# Patient Record
Sex: FEMALE | Race: WHITE | NOT HISPANIC OR LATINO | Employment: FULL TIME | URBAN - METROPOLITAN AREA
[De-identification: names, ages, dates, MRNs, and addresses within clinical notes are randomized per-mention and may not be internally consistent; named-entity substitution may affect disease eponyms.]

---

## 2017-01-16 ENCOUNTER — HOSPITAL ENCOUNTER (OUTPATIENT)
Dept: LAB | Facility: MEDICAL CENTER | Age: 37
End: 2017-01-16
Attending: NURSE PRACTITIONER
Payer: COMMERCIAL

## 2017-01-16 ENCOUNTER — HOSPITAL ENCOUNTER (OUTPATIENT)
Facility: MEDICAL CENTER | Age: 37
End: 2017-01-16
Attending: INTERNAL MEDICINE
Payer: COMMERCIAL

## 2017-01-16 PROCEDURE — 83516 IMMUNOASSAY NONANTIBODY: CPT

## 2017-01-16 PROCEDURE — 36415 COLL VENOUS BLD VENIPUNCTURE: CPT

## 2017-01-16 PROCEDURE — 82784 ASSAY IGA/IGD/IGG/IGM EACH: CPT

## 2017-01-17 LAB — IGA SERPL-MCNC: 130 MG/DL (ref 68–408)

## 2017-01-18 LAB — TTG IGA SER IA-ACNC: 0 U/ML (ref 0–3)

## 2017-02-28 ENCOUNTER — OFFICE VISIT (OUTPATIENT)
Dept: URGENT CARE | Facility: PHYSICIAN GROUP | Age: 37
End: 2017-02-28
Payer: COMMERCIAL

## 2017-02-28 VITALS
TEMPERATURE: 98.8 F | SYSTOLIC BLOOD PRESSURE: 148 MMHG | DIASTOLIC BLOOD PRESSURE: 80 MMHG | HEART RATE: 70 BPM | OXYGEN SATURATION: 97 % | BODY MASS INDEX: 25.61 KG/M2 | WEIGHT: 150 LBS | HEIGHT: 64 IN

## 2017-02-28 DIAGNOSIS — J01.00 ACUTE NON-RECURRENT MAXILLARY SINUSITIS: ICD-10-CM

## 2017-02-28 PROCEDURE — 99214 OFFICE O/P EST MOD 30 MIN: CPT | Performed by: PHYSICIAN ASSISTANT

## 2017-02-28 RX ORDER — AMOXICILLIN AND CLAVULANATE POTASSIUM 875; 125 MG/1; MG/1
1 TABLET, FILM COATED ORAL 2 TIMES DAILY
Qty: 14 TAB | Refills: 0 | Status: SHIPPED | OUTPATIENT
Start: 2017-02-28 | End: 2017-03-07

## 2017-02-28 ASSESSMENT — ENCOUNTER SYMPTOMS
CHILLS: 0
SHORTNESS OF BREATH: 0
SORE THROAT: 1
WHEEZING: 0
PALPITATIONS: 0
COUGH: 1
HEADACHES: 1
FEVER: 0
SINUS PRESSURE: 1

## 2017-02-28 NOTE — Clinical Note
February 28, 2017         Patient: Kate Pierce   YOB: 1980   Date of Visit: 2/28/2017           To Whom it May Concern:    Kate Pierce was seen in my clinic on 2/28/2017. Please excuse her from work on 3/1/2017  If you have any questions or concerns, please don't hesitate to call.        Sincerely,           Ney Alvarez PA-C  Electronically Signed

## 2017-03-01 NOTE — PROGRESS NOTES
Subjective:      Kate Pierce is a 36 y.o. female who presents with Pharyngitis            Sinus Problem  This is a new problem. The current episode started in the past 7 days. The problem is unchanged. The pain is moderate. Associated symptoms include congestion, coughing, ear pain, headaches, sinus pressure and a sore throat. Pertinent negatives include no chills or shortness of breath. Past treatments include oral decongestants. The treatment provided no relief.       Review of Systems   Constitutional: Negative for fever and chills.   HENT: Positive for congestion, ear pain, sinus pressure and sore throat.    Respiratory: Positive for cough. Negative for shortness of breath and wheezing.    Cardiovascular: Negative for chest pain and palpitations.   Neurological: Positive for headaches.     All other systems reviewed and are negative.  PMH:  has a past medical history of Shingles (2011).  MEDS:   Current outpatient prescriptions:   •  amoxicillin-clavulanate (AUGMENTIN) 875-125 MG Tab, Take 1 Tab by mouth 2 times a day for 7 days., Disp: 14 Tab, Rfl: 0  •  norgestimate-ethinyl estradiol (ORTHO-CYCLEN) 0.25-35 MG-MCG per tablet, Take 1 Tab by mouth every day., Disp: 28 Tab, Rfl: 11  •  Calcium-Magnesium-Vitamin D (CALCIUM 500 PO), Take  by mouth., Disp: , Rfl:   •  tretinoin (RETIN-A) 0.1 % cream, Apply to acne sites every evening, Disp: 45 g, Rfl: 3  •  benzoyl peroxide-erythromycin (BENZAMYCIN) gel, Apply to acne sites every morning, Disp: 50 g, Rfl: 3  ALLERGIES: No Known Allergies  SURGHX: No past surgical history on file.  SOCHX:  reports that she quit smoking about 2 years ago. Her smoking use included Cigarettes. She has a 7.5 pack-year smoking history. She has never used smokeless tobacco. She reports that she drinks alcohol. She reports that she does not use illicit drugs.  FH: Family history was reviewed, no pertinent findings to report  Medications, Allergies, and current problem list reviewed today  "in Epic       Objective:     /80 mmHg  Pulse 70  Temp(Src) 37.1 °C (98.8 °F)  Ht 1.626 m (5' 4\")  Wt 68.04 kg (150 lb)  BMI 25.73 kg/m2  SpO2 97%     Physical Exam   Constitutional: She is oriented to person, place, and time. Vital signs are normal. She appears well-developed and well-nourished.   HENT:   Head: Normocephalic and atraumatic.   Right Ear: Hearing, tympanic membrane, external ear and ear canal normal.   Left Ear: Hearing, tympanic membrane, external ear and ear canal normal.   Nose: Mucosal edema and rhinorrhea present. No sinus tenderness. No epistaxis. Right sinus exhibits maxillary sinus tenderness. Left sinus exhibits maxillary sinus tenderness.   Mouth/Throat: Uvula is midline, oropharynx is clear and moist and mucous membranes are normal.   Neck: Normal range of motion. Neck supple.   Cardiovascular: Normal rate, regular rhythm, normal heart sounds and intact distal pulses.    Pulmonary/Chest: Effort normal and breath sounds normal.   Neurological: She is alert and oriented to person, place, and time.   Skin: Skin is warm and dry.   Psychiatric: She has a normal mood and affect. Her behavior is normal.   Vitals reviewed.              Assessment/Plan:     1. Acute non-recurrent maxillary sinusitis  -flonase/afrin  - amoxicillin-clavulanate (AUGMENTIN) 875-125 MG Tab; Take 1 Tab by mouth 2 times a day for 7 days.  Dispense: 14 Tab; Refill: 0    Differential diagnosis, natural history, supportive care, and indications for immediate follow-up discussed at length.   Follow-up with primary care provider within 4-5 days, emergency room precautions discussed.  Patient and/or family appears understanding of information.        "

## 2017-03-01 NOTE — PATIENT INSTRUCTIONS

## 2017-04-08 ENCOUNTER — OFFICE VISIT (OUTPATIENT)
Dept: URGENT CARE | Facility: PHYSICIAN GROUP | Age: 37
End: 2017-04-08
Payer: COMMERCIAL

## 2017-04-08 VITALS
TEMPERATURE: 97.2 F | SYSTOLIC BLOOD PRESSURE: 112 MMHG | BODY MASS INDEX: 25.61 KG/M2 | DIASTOLIC BLOOD PRESSURE: 72 MMHG | OXYGEN SATURATION: 97 % | RESPIRATION RATE: 16 BRPM | WEIGHT: 150 LBS | HEIGHT: 64 IN | HEART RATE: 81 BPM

## 2017-04-08 DIAGNOSIS — J01.00 ACUTE NON-RECURRENT MAXILLARY SINUSITIS: ICD-10-CM

## 2017-04-08 DIAGNOSIS — H10.31 ACUTE BACTERIAL CONJUNCTIVITIS OF RIGHT EYE: ICD-10-CM

## 2017-04-08 PROCEDURE — 99214 OFFICE O/P EST MOD 30 MIN: CPT | Performed by: PHYSICIAN ASSISTANT

## 2017-04-08 RX ORDER — AMOXICILLIN AND CLAVULANATE POTASSIUM 875; 125 MG/1; MG/1
1 TABLET, FILM COATED ORAL 2 TIMES DAILY
Qty: 20 TAB | Refills: 0 | Status: SHIPPED | OUTPATIENT
Start: 2017-04-08 | End: 2017-04-18

## 2017-04-08 RX ORDER — OFLOXACIN 3 MG/ML
SOLUTION/ DROPS OPHTHALMIC
Qty: 5 ML | Refills: 1 | Status: SHIPPED | OUTPATIENT
Start: 2017-04-08 | End: 2017-08-25

## 2017-04-08 ASSESSMENT — ENCOUNTER SYMPTOMS
EYE REDNESS: 1
EYE PAIN: 1
COUGH: 1
EYE DISCHARGE: 1
GASTROINTESTINAL NEGATIVE: 1
DIZZINESS: 0
HEADACHES: 1
CARDIOVASCULAR NEGATIVE: 1
MUSCULOSKELETAL NEGATIVE: 1

## 2017-04-08 NOTE — MR AVS SNAPSHOT
"        Kate Pierce   2017 10:25 AM   Office Visit   MRN: [id number]    Department:  [facility]   Dept Phone:  [phone]    Description:  Female : 1980   Provider:  Dex Rankin PA-C           Reason for Visit     Eye Problem redness, discharge, right eye, x 1 day      Allergies as of 2017     No Known Allergies      You were diagnosed with     Acute bacterial conjunctivitis of right eye   [0135885]       Acute non-recurrent maxillary sinusitis   [[id number]]         Vital Signs     Blood Pressure Pulse Temperature Respirations Height Weight    112/72 mmHg 81 36.2 °C (97.2 °F) 16 1.626 m (5' 4.02\") 68.04 kg (150 lb)    Body Mass Index Oxygen Saturation Smoking Status             25.73 kg/m2 97% Former Smoker         Basic Information     Date Of Birth Sex Race Ethnicity Preferred Language    1980 Female White Non- English      Problem List              ICD-10-CM Priority Class Noted - Resolved    Generalized abdominal pain R10.84   3/23/2016 - Present    Acne vulgaris L70.0   3/23/2016 - Present    Ganglion of right wrist M67.431   3/23/2016 - Present      Health Maintenance        Date Due Completion Dates    IMM DTaP/Tdap/Td Vaccine (1 - Tdap) 2000 ---    PAP SMEAR 2002 ---            Current Immunizations     Hepatitis B Vaccine Recombivax (Adol/Adult) 2016    Influenza Vaccine Quad Inj (Preserved) 2016    MMR Vaccine 2016    QF GOLD 2016    QUANTIFERON GOLD 2016      Below and/or attached are the medications your provider expects you to take. Review all of your home medications and newly ordered medications with your provider and/or pharmacist. Follow medication instructions as directed by your provider and/or pharmacist. Please keep your medication list with you and share with your provider. Update the information when medications are discontinued, doses are changed, or new medications (including over-the-counter products) are added; " and carry medication information at all times in the event of emergency situations     Allergies:  No Known Allergies          Medications  Valid as of: April 08, 2017 - 11:43 AM    Generic Name Brand Name Tablet Size Instructions for use    Amoxicillin-Pot Clavulanate (Tab) AUGMENTIN 875-125 MG Take 1 Tab by mouth 2 times a day for 10 days.        Benzoyl Peroxide-Erythromycin (Gel) BENZAMYCIN 5-3 % Apply to acne sites every morning        Calcium-Magnesium-Vitamin D   Take  by mouth.        Norgestimate-Eth Estradiol (Tab) ORTHO-CYCLEN 0.25-35 MG-MCG Take 1 Tab by mouth every day.        Ofloxacin (Solution) OCUFLOX 0.3 % 1-2 DROPS TO AFFECTED EYE EVERY 2-4 HOURS WHILE AWAKE. USE FOR ONE EXTRA DAY AFTER BETTER.        Tretinoin (Cream) RETIN-A 0.1 % Apply to acne sites every evening        .                 Medicines prescribed today were sent to:     [id number] - [place], [place] [id number] [address] [address]    Phone: [phone] Fax: [phone]    Open 24 Hours?: No      Medication refill instructions:       If your prescription bottle indicates you have medication refills left, it is not necessary to call your provider’s office. Please contact your pharmacy and they will refill your medication.    If your prescription bottle indicates you do not have any refills left, you may request refills at any time through one of the following ways: The online [employer] system (except Urgent Care), by calling your provider’s office, or by asking your pharmacy to contact your provider’s office with a refill request. Medication refills are processed only during regular business hours and may not be available until the next business day. Your provider may request additional information or to have a follow-up visit with you prior to refilling your medication.   *Please Note: Medication refills are assigned a new Rx number when refilled electronically. Your pharmacy may indicate that no  refills were authorized even though a new prescription for the same medication is available at the pharmacy. Please request the medicine by name with the pharmacy before contacting your provider for a refill.        Instructions    Nasal saline irrigation (netti pot or Darion Med Sinus Rinse).  Used distilled water or boiled tap water with nasal flushes, not straight tap water.  Humidifier at bedtime.  Hot steam showers to loosen up mucous.  Cough medicine at bedtime.  Lots of fluids, tea with honey.  Ibuprofen for headache, fever, chills.  Be sure to take with food.  Salt water gargles.  Return if worsening: Yellow thicker mucus changes, worsening pain around the eyes and radiates to the teeth, and fever over 101°F.            [employer] Access Code: Activation code not generated  Current [employer] Status: Active

## 2017-04-08 NOTE — PROGRESS NOTES
Subjective:      Kate Pierce is a 36 y.o. female who presents with Eye Problem            Eye Problem   Associated symptoms include an eye discharge and eye redness.     Chief Complaint   Patient presents with   • Eye Problem     redness, discharge, right eye, x 1 day       HPI:  Kate Pierce is a 36 y.o. female who presents with red eye pain and discharge.  Was wearing contact lens yesterday.  Woke with goup/matted eye.  Contact at work with conjunctivitis.  Also having sinus congestion and pain around the eyes. Yellow to white thicker mucus production. She did not complete Augmentin prescribed back in February. No nausea. Worsening fatigue but no fever or chills.     Past Medical History   Diagnosis Date   • Shingles 2011       No past surgical history on file.    Family History   Problem Relation Age of Onset   • Hypertension Mother    • Diabetes Father        Social History     Social History   • Marital Status:      Spouse Name: N/A   • Number of Children: N/A   • Years of Education: N/A     Occupational History   • Not on file.     Social History Main Topics   • Smoking status: Former Smoker -- 0.50 packs/day for 15 years     Types: Cigarettes     Quit date: 12/23/2014   • Smokeless tobacco: Never Used   • Alcohol Use: Yes   • Drug Use: No   • Sexual Activity:     Partners: Male     Birth Control/ Protection: Pill     Other Topics Concern   • Not on file     Social History Narrative    She is  and just recently moved to [place] from [facility]. She works at [employer] in [employer]          Current outpatient prescriptions:   •  norgestimate-ethinyl estradiol, 1 Tab, Oral, DAILY  •  Calcium-Magnesium-Vitamin D (CALCIUM 500 PO), Take  by mouth.  •  tretinoin, Apply to acne sites every evening  •  benzoyl peroxide-erythromycin, Apply to acne sites every morning    No Known Allergies         Review of Systems   Constitutional: Positive for malaise/fatigue.   HENT: Positive for congestion and ear  "pain.    Eyes: Positive for pain, discharge and redness.   Respiratory: Positive for cough.    Cardiovascular: Negative.    Gastrointestinal: Negative.    Musculoskeletal: Negative.    Skin: Negative.    Neurological: Positive for headaches. Negative for dizziness.   All other systems reviewed and are negative.         Objective:     /72 mmHg  Pulse 81  Temp(Src) 36.2 °C (97.2 °F)  Resp 16  Ht 1.626 m (5' 4.02\")  Wt 68.04 kg (150 lb)  BMI 25.73 kg/m2  SpO2 97%     Physical Exam       Nursing notes and vitals reviewed.    Constitutional:   Appropriately groomed, pleasant affect, well nourished, in NAD.    Head:   Normocephalic, atraumatic.    Eyes:   PERRLA, EOM's full, Right/Left eye: sclera injected, conjunctiva erythematous, and medial canthus with yellow exudate.  No preauricular lymphadenopathy.      Ears:  Hearing grossly intact to voice.    Nose:  Nares patent bilaterally.  Nasal mucosa edematous with yellow exudate bilaterally and edema. Sinus tenderness percussion over the maxillary sinuses bilaterally.    Throat:  Dentition wnl, mucosa moist without lesions.  Oropharynx not erythematous, with no enlargement of the palatine tonsils bilaterally with no exudates.    No post nasal drainage present.  Soft palate rises symmetrically bilaterally and uvula midline.      Neck: Neck supple, with mild anterior lymphadenopathy that is soft and mobile to palpation. Thyroid non-palpable without tenderness or nodules. No supraclavicular lymphadenopathy.    Lungs:  Respiratory effort not labored without accessory muscle use.     Musculoskeletal:  Gait non-antalgic with a narrow base.    Derm:  Skin without rashes or lesions with good turgor pressure.      Psychiatric:  Mood, affect, and judgement appropriate.         Assessment/Plan:     1. Acute bacterial conjunctivitis of right eye  Patient presents with right eye conjunctivitis with purulent discharge. Patient is a contact lens wearer. Prescribed Ocuflox " recommended using both eyes. Reviewed symptoms for measures. Advised patient to follow up with ophthalmologist if not improving.    - ofloxacin (OCUFLOX) 0.3 % Solution; 1-2 DROPS TO AFFECTED EYE EVERY 2-4 HOURS WHILE AWAKE. USE FOR ONE EXTRA DAY AFTER BETTER.  Dispense: 5 mL; Refill: 1    2. Acute non-recurrent maxillary sinusitis  Patient with a long history of sinus pressure and congestion with maxillary sinus tenderness to percussion and cries on exam. She did not take Augmentin prescribed in February and has tried multiple antihistamines and nasal sprays with no improvement. Recommended nasal saline irrigation and Augmentin if not improving over the weekend. Reviewed other symptoms support measures.    Patient was in agreement with this treatment plan and seemed to understand without barriers. All questions were encouraged and answered.  Reviewed signs and symptoms of when to seek emergency medical care.     Please note that this dictation was created using voice recognition software.  I have made every reasonable attempt to correct obvious errors, but I expect there are errors of norma and possibly content that I did not discover before finalizing the note.     - amoxicillin-clavulanate (AUGMENTIN) 875-125 MG Tab; Take 1 Tab by mouth 2 times a day for 10 days.  Dispense: 20 Tab; Refill: 0

## 2017-04-08 NOTE — PATIENT INSTRUCTIONS
Nasal saline irrigation (netti pot or Darion Med Sinus Rinse).  Used distilled water or boiled tap water with nasal flushes, not straight tap water.  Humidifier at bedtime.  Hot steam showers to loosen up mucous.  Cough medicine at bedtime.  Lots of fluids, tea with honey.  Ibuprofen for headache, fever, chills.  Be sure to take with food.  Salt water gargles.  Return if worsening: Yellow thicker mucus changes, worsening pain around the eyes and radiates to the teeth, and fever over 101°F.

## 2017-07-24 DIAGNOSIS — Z30.011 ENCOUNTER FOR INITIAL PRESCRIPTION OF CONTRACEPTIVE PILLS: ICD-10-CM

## 2017-07-24 RX ORDER — NORGESTIMATE AND ETHINYL ESTRADIOL 0.25-0.035
1 KIT ORAL DAILY
Qty: 28 TAB | Refills: 11 | Status: SHIPPED | OUTPATIENT
Start: 2017-07-24 | End: 2018-07-02 | Stop reason: SDUPTHER

## 2017-08-25 ENCOUNTER — OFFICE VISIT (OUTPATIENT)
Dept: MEDICAL GROUP | Facility: PHYSICIAN GROUP | Age: 37
End: 2017-08-25
Payer: COMMERCIAL

## 2017-08-25 VITALS
SYSTOLIC BLOOD PRESSURE: 118 MMHG | OXYGEN SATURATION: 98 % | RESPIRATION RATE: 12 BRPM | BODY MASS INDEX: 26.16 KG/M2 | DIASTOLIC BLOOD PRESSURE: 68 MMHG | HEART RATE: 82 BPM | WEIGHT: 157 LBS | TEMPERATURE: 97.8 F | HEIGHT: 65 IN

## 2017-08-25 DIAGNOSIS — R19.8 ALTERNATING CONSTIPATION AND DIARRHEA: ICD-10-CM

## 2017-08-25 DIAGNOSIS — R10.11 RUQ PAIN: ICD-10-CM

## 2017-08-25 PROBLEM — K59.09 CHRONIC CONSTIPATION: Status: ACTIVE | Noted: 2017-08-25

## 2017-08-25 PROCEDURE — 99214 OFFICE O/P EST MOD 30 MIN: CPT | Performed by: NURSE PRACTITIONER

## 2017-08-25 ASSESSMENT — PATIENT HEALTH QUESTIONNAIRE - PHQ9: CLINICAL INTERPRETATION OF PHQ2 SCORE: 0

## 2017-08-25 NOTE — ASSESSMENT & PLAN NOTE
Ongoing chronic problem. Has intermittent constipation alternating with loose stool, constipation mostly though. Normal is BM every 2-3 days. Improved with antibiotics in December for bacteria overgrowth treatment, but returned 6 weeks later. Failed: probiotics and Metamucil. Miralax causes nausea and bloating

## 2017-08-25 NOTE — ASSESSMENT & PLAN NOTE
New problem. For the past month she has experienced intermittent RUQ pain. Sometimes pain is sharp, other times feels like spasms. Feels like a pinching and twisting sensation. Occurring a few times a week. Lasts 1-2 minutes a time. Does not seem to be related to food intake. Has occasional indigestions. No N/V, or blood in stool.    She does have chronic constipation. Chronic bloating. Was evaluated at [facility] December 2016 and treated with 10 days antibiotics for possible bacteria overgrowth. With this tx, sx improved, and lower abdominal pain resolved, but then returned about 6 weeks later, although in a more mild form. Not worsening, as GI notes did have ovarian CA in differential if symptoms progressed.

## 2017-08-27 PROBLEM — R19.8 ALTERNATING CONSTIPATION AND DIARRHEA: Status: ACTIVE | Noted: 2017-08-25

## 2017-08-28 NOTE — PROGRESS NOTES
Subjective:     Chief Complaint   Patient presents with   • RUQ Pain       HPI  Kate Pierce is a 36 y.o. female here today for new RUQ pain     RUQ pain  New problem. For the past month she has experienced intermittent RUQ pain. Sometimes pain is sharp, other times feels like spasms. Feels like a pinching and twisting sensation. Occurring a few times a week. Lasts 1-2 minutes a time. Does not seem to be related to food intake. Has occasional indigestions. No N/V, early satiety, or blood in stool.    She does have chronic constipation. Chronic bloating of lower abdomen. Was evaluated at [facility] December 2016 and treated with 10 days antibiotics for possible bacteria overgrowth. With this tx, sx improved, and lower abdominal pain resolved, but then returned about 6 weeks later, although in a more mild form. Not worsening, as GI notes did have ovarian CA in differential if symptoms progressed.       Alternating constipation and diarrhea  Ongoing chronic problem. Has intermittent constipation alternating with loose stool, constipation mostly though. Normal is BM every 2-3 days. Improved with antibiotics in December for bacteria overgrowth treatment, but returned 6 weeks later. Failed: probiotics and Metamucil. Miralax causes nausea and bloating        Diagnoses of RUQ pain and Alternating constipation and diarrhea were pertinent to this visit.    Allergies: Review of patient's allergies indicates no known allergies.  Current medicines (including changes today)  Current Outpatient Prescriptions   Medication Sig Dispense Refill   • norgestimate-ethinyl estradiol (ORTHO-CYCLEN) 0.25-35 MG-MCG per tablet Take 1 Tab by mouth every day. 28 Tab 11   • Calcium-Magnesium-Vitamin D (CALCIUM 500 PO) Take  by mouth.       No current facility-administered medications for this visit.        She  has a past medical history of Shingles (2011).      ROS  As stated in HPI and additionally  General: No F/C, fatigue, weight loss  GI:  "see HPI   : No frequency or dysuria       Objective:     Blood pressure 118/68, pulse 82, temperature 36.6 °C (97.8 °F), resp. rate 12, height 1.651 m (5' 5\"), weight 71.2 kg (157 lb), SpO2 98 %. Body mass index is 26.13 kg/m².  Physical Exam:  General: Alert, oriented, in no acute distress.  Eye contact is good, speech goal directed, affect calm  CNs grossly intact.  HEENT: conjunctiva non-injected, sclera non-icteric, EOMs intact.   Gross hearing intact.  Oral mucous membranes pink and moist with no lesions. Oropharynx without erythema, or exudate.   Lungs: unlabored. clear to auscultation bilaterally with good excursion.  CV: regular rate and rhythm. No murmurs. No carotid bruits.   Abdomen: Soft, ND, TTP over RLQ and LLQ. No rebound tenderness or guarding. No hepatosplenomegaly. Vera's sign slightly positive, but she is anxious about region being palpated also. Bowel sounds active.   Ext: no edema, normal color and temperature.   Skin: No rashes or lesions in visible areas  Gait steady.     Assessment and Plan:   Assessment/Plan:  1. RUQ pain  New problem. Check US. Monitor for results. ER warning signs reviewed.   - US-LIVER AND BILIARY TREE; Future    2. Alternating constipation and diarrhea  Chronic problem not controlled. Discussed peppermint oil. F/u with GI for further work up/management. Not pursuing ovarian Ca differential at this time as no red flag symptoms are present, and symptoms are not worsening.      Follow up:  Return in about 1 week (around 9/1/2017).    Educated in proper administration of medication(s) ordered today including safety, possible SE, risks, benefits, rationale and alternatives to therapy.   Supportive care, differential diagnoses, and indications for immediate follow-up discussed with patient.    Pathogenesis of diagnosis discussed including typical length and natural progression.    Instructed to return to clinic or nearest emergency department for any change in condition, " further concerns, or worsening of symptoms.  Patient states understanding of the plan of care and discharge instructions.      Please note that this dictation was created using voice recognition software. I have made every reasonable attempt to correct obvious errors, but I expect that there are errors of grammar and possibly content that I did not discover before finalizing the note.    Followup: Return in about 1 week (around 9/1/2017). sooner should new symptoms or problems arise.

## 2017-09-03 ENCOUNTER — HOSPITAL ENCOUNTER (OUTPATIENT)
Dept: RADIOLOGY | Facility: MEDICAL CENTER | Age: 37
End: 2017-09-03
Attending: NURSE PRACTITIONER
Payer: COMMERCIAL

## 2017-09-03 DIAGNOSIS — R10.11 RUQ PAIN: ICD-10-CM

## 2017-09-03 PROCEDURE — 76705 ECHO EXAM OF ABDOMEN: CPT

## 2017-10-09 ENCOUNTER — OFFICE VISIT (OUTPATIENT)
Dept: URGENT CARE | Facility: PHYSICIAN GROUP | Age: 37
End: 2017-10-09
Payer: COMMERCIAL

## 2017-10-09 VITALS
SYSTOLIC BLOOD PRESSURE: 128 MMHG | WEIGHT: 150 LBS | BODY MASS INDEX: 24.99 KG/M2 | DIASTOLIC BLOOD PRESSURE: 88 MMHG | RESPIRATION RATE: 18 BRPM | TEMPERATURE: 99 F | HEART RATE: 89 BPM | HEIGHT: 65 IN | OXYGEN SATURATION: 98 %

## 2017-10-09 DIAGNOSIS — T23.202A BURN OF LEFT HAND, SECOND DEGREE, INITIAL ENCOUNTER: ICD-10-CM

## 2017-10-09 PROCEDURE — 99214 OFFICE O/P EST MOD 30 MIN: CPT | Performed by: FAMILY MEDICINE

## 2017-10-09 RX ORDER — HYDROCODONE BITARTRATE AND ACETAMINOPHEN 5; 325 MG/1; MG/1
1-2 TABLET ORAL EVERY 6 HOURS PRN
Qty: 20 TAB | Refills: 0 | Status: SHIPPED | OUTPATIENT
Start: 2017-10-09 | End: 2019-06-17

## 2017-10-09 NOTE — LETTER
October 9, 2017         Patient: Kate Pierce   YOB: 1980   Date of Visit: 10/9/2017           To Whom it May Concern:    Kate Pierce was seen in my clinic on 10/9/2017. Please excuse 10/10 and 10/11/2017.       Sincerely,           Camilo Garcia M.D.  Electronically Signed

## 2017-10-10 ASSESSMENT — ENCOUNTER SYMPTOMS
FOCAL WEAKNESS: 0
MYALGIAS: 0
FEVER: 0
SENSORY CHANGE: 0
CHILLS: 0
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
## 2018-05-20 ENCOUNTER — OFFICE VISIT (OUTPATIENT)
Dept: URGENT CARE | Facility: PHYSICIAN GROUP | Age: 38
End: 2018-05-20
Payer: COMMERCIAL

## 2018-05-20 VITALS
BODY MASS INDEX: 24.16 KG/M2 | HEART RATE: 89 BPM | OXYGEN SATURATION: 96 % | HEIGHT: 65 IN | TEMPERATURE: 97.9 F | WEIGHT: 145 LBS | DIASTOLIC BLOOD PRESSURE: 80 MMHG | RESPIRATION RATE: 16 BRPM | SYSTOLIC BLOOD PRESSURE: 112 MMHG

## 2018-05-20 DIAGNOSIS — J01.00 ACUTE MAXILLARY SINUSITIS, RECURRENCE NOT SPECIFIED: ICD-10-CM

## 2018-05-20 PROCEDURE — 99213 OFFICE O/P EST LOW 20 MIN: CPT | Performed by: FAMILY MEDICINE

## 2018-05-20 RX ORDER — AMOXICILLIN AND CLAVULANATE POTASSIUM 875; 125 MG/1; MG/1
1 TABLET, FILM COATED ORAL 2 TIMES DAILY
Qty: 20 TAB | Refills: 0 | Status: SHIPPED | OUTPATIENT
Start: 2018-05-20 | End: 2018-05-30

## 2018-05-20 ASSESSMENT — ENCOUNTER SYMPTOMS
SORE THROAT: 0
EYE DISCHARGE: 0
ABDOMINAL PAIN: 0
PALPITATIONS: 0
COUGH: 0
FEVER: 0
NECK PAIN: 0
SPUTUM PRODUCTION: 0
SHORTNESS OF BREATH: 0
SINUS PAIN: 1
VOMITING: 0
CHILLS: 0
NAUSEA: 0
EYE REDNESS: 0

## 2018-05-20 NOTE — PROGRESS NOTES
Subjective:      Kate Pierce is a 37 y.o. female who presents with Nasal Congestion (congestion, sinus pain x1 week)            Patient presents with:  Nasal Congestion: congestion, sinus pain x1 week      HISTORY OF PRESENT ILLNESS: Patient is a 37 y.o. female who presents today due to 7 days of nasal congestion,  headache, and  sinus pressure. However denies associated cough, difficulty breathing, confusion, nausea, vomiting or diarrhea. She has tried OTC cold/sinus medication at home without much improvement. Denies a history of seasonal allergies or sinus infections in the past. No known ill contacts at home. No recent antibiotic usage.     Patient Active Problem List    RUQ pain         Date Noted: 08/25/2017      Alternating constipation and diarrhea         Date Noted: 08/25/2017      Generalized abdominal pain         Date Noted: 03/23/2016      Acne vulgaris         Date Noted: 03/23/2016      Ganglion of right wrist         Date Noted: 03/23/2016        Allergies:Patient has no known allergies.    Current Outpatient Prescriptions Ordered in Epic:  norgestimate-ethinyl estradiol (ORTHO-CYCLEN) 0.25-35 MG-MCG per tablet, Take 1 Tab by mouth every day., Disp: 28 Tab, Rfl: 11  silver sulfADIAZINE (SILVADENE) 1 % Cream, Apply to affected area twice daily., Disp: 85 g, Rfl: 0  hydrocodone-acetaminophen (NORCO) 5-325 MG Tab per tablet, Take 1-2 Tabs by mouth every 6 hours as needed., Disp: 20 Tab, Rfl: 0  Calcium-Magnesium-Vitamin D (CALCIUM 500 PO), Take  by mouth., Disp: , Rfl:      No current Epic-ordered facility-administered medications on file.       Past Medical History:  2011: Shingles    Smoking status: Former Smoker                                                              Packs/day: 0.50      Years: 15.00        Types: Cigarettes     Quit date: 12/23/2014  Smokeless tobacco: Never Used                      Alcohol use: Yes           0.0 oz/week      Relation  Status                         "Comments  Mo        Alive                           Fa        Alive                           Sis       Alive                           Iliana       Alive                           Review of patient's family history indicates:    Hypertension                   Mother                    Diabetes                       Father                                            Review of Systems   Constitutional: Negative for chills, fever and malaise/fatigue.   HENT: Positive for congestion and sinus pain. Negative for ear pain and sore throat.    Eyes: Negative for discharge and redness.   Respiratory: Negative for cough, sputum production and shortness of breath.    Cardiovascular: Negative for chest pain and palpitations.   Gastrointestinal: Negative for abdominal pain, nausea and vomiting.   Musculoskeletal: Negative for neck pain.          Objective:     /80   Pulse 89   Temp 36.6 °C (97.9 °F)   Resp 16   Ht 1.651 m (5' 5\")   Wt 65.8 kg (145 lb)   SpO2 96%   BMI 24.13 kg/m²      Physical Exam   Constitutional: She is oriented to person, place, and time. She appears well-developed and well-nourished.   HENT:   Head: Normocephalic and atraumatic.   Right Ear: External ear normal.   Left Ear: External ear normal.   Maxillary sinus pressure   Eyes: EOM are normal. Pupils are equal, round, and reactive to light. Right eye exhibits no discharge. Left eye exhibits no discharge.   Neck: Normal range of motion. Neck supple.   Cardiovascular: Normal rate, regular rhythm and normal heart sounds.    Pulmonary/Chest: Effort normal and breath sounds normal. No respiratory distress. She has no wheezes.   Neurological: She is alert and oriented to person, place, and time.   Skin: Skin is warm and dry.               Assessment/Plan:     1. Acute maxillary sinusitis, recurrence not specified  amoxicillin-clavulanate (AUGMENTIN) 875-125 MG Tab           Antibiotic as directed, potential side effects of medication discussed. Flonase " as directed.   Nasal washes with sterile saline solution daily. Sleep with HOB elevated, humidifier at night, rest, increase fluid intake.   Supportive care, differential diagnoses, and indications for immediate follow-up discussed with patient.   Pathogenesis of diagnosis discussed including typical length and natural progression.   Instructed to return to clinic or nearest emergency department for any change in condition, further concerns, or worsening of symptoms.  Patient states understanding of the plan of care and discharge instructions.  Instructed to make an appointment, for follow up, with their primary care provider.

## 2018-07-02 DIAGNOSIS — Z30.011 ENCOUNTER FOR INITIAL PRESCRIPTION OF CONTRACEPTIVE PILLS: ICD-10-CM

## 2018-07-02 NOTE — TELEPHONE ENCOUNTER
Was the patient seen in the last year in this department? Yes LOV 08/25/17       Does patient have an active prescription for medications requested? No     Received Request Via: Pharmacy

## 2018-07-03 RX ORDER — NORGESTIMATE AND ETHINYL ESTRADIOL 0.25-0.035
KIT ORAL
Qty: 84 TAB | Refills: 3 | Status: SHIPPED | OUTPATIENT
Start: 2018-07-03 | End: 2019-06-17 | Stop reason: SDUPTHER

## 2018-08-09 ENCOUNTER — GYNECOLOGY VISIT (OUTPATIENT)
Dept: OBGYN | Facility: MEDICAL CENTER | Age: 38
End: 2018-08-09
Payer: COMMERCIAL

## 2018-08-09 ENCOUNTER — HOSPITAL ENCOUNTER (OUTPATIENT)
Facility: MEDICAL CENTER | Age: 38
End: 2018-08-09
Attending: OBSTETRICS & GYNECOLOGY
Payer: COMMERCIAL

## 2018-08-09 VITALS
WEIGHT: 146 LBS | BODY MASS INDEX: 24.32 KG/M2 | SYSTOLIC BLOOD PRESSURE: 110 MMHG | HEIGHT: 65 IN | DIASTOLIC BLOOD PRESSURE: 80 MMHG

## 2018-08-09 DIAGNOSIS — Z12.39 SCREENING FOR BREAST CANCER: ICD-10-CM

## 2018-08-09 DIAGNOSIS — Z12.4 SCREENING FOR MALIGNANT NEOPLASM OF CERVIX: ICD-10-CM

## 2018-08-09 DIAGNOSIS — Z01.419 WELL WOMAN EXAM: ICD-10-CM

## 2018-08-09 DIAGNOSIS — Z11.51 SPECIAL SCREENING EXAMINATION FOR HUMAN PAPILLOMAVIRUS (HPV): ICD-10-CM

## 2018-08-09 PROCEDURE — 99395 PREV VISIT EST AGE 18-39: CPT | Performed by: OBSTETRICS & GYNECOLOGY

## 2018-08-09 PROCEDURE — 87624 HPV HI-RISK TYP POOLED RSLT: CPT

## 2018-08-09 PROCEDURE — 88175 CYTOPATH C/V AUTO FLUID REDO: CPT

## 2018-08-09 NOTE — PROGRESS NOTES
"Kate Pierce37 y.o.  female presents for Annual Well Woman Exam.  Doing well, no complaints, happy with OCPs    ROS: Patient is feeling well. No dyspnea or chest pain on exertion. No Abdominal pain, change in bowel habits, black or bloody stools. No urinary sx. GYN ROS:normal menses, no abnormal bleeding, pelvic pain or discharge, no breast pain or new or enlarging lumps on self exam, no side effects of hormonal medications. Denies breast tenderness, mass, discharge, changes in size or contour, or abnormal cyclic discomfort. No neurological complaints.  Past Medical History:   Diagnosis Date   • Shingles      Colposcopy age 29  Allergy:   Patient has no known allergies.    LMP:       Patient's last menstrual period was 2018. .     Menstrual History: on OC to regulate periods (current)  Contraceptive Method:OCP (estrogen/progesterone)      Objective : The patient appears well, alert and oriented x 3, in no acute distress.  Blood pressure 110/80, height 1.651 m (5' 5\"), weight 66.2 kg (146 lb), last menstrual period 2018.  HEENT Exam: EOMI, FORTUNATO, no adenopathy or thyromegaly.  Lungs: Clear to Auscultation and Percussion.  Cor: S1 and S2 normal, no murmurs, or rubs   Abdomen: Soft without tenderness, guarding mass or organomegaly.  Extremities: No edema, pulses equal  Neurological: Normal No focal signs  Breast Exam:Inspection negative. No nipple discharge or bleeding. No masses or nodularity palpable  Pelvic: External genitalia,urethral meatus, urethra, bladder and vagina normal. Cervix, uterus and adnexa intact and normal.  Anus and perineum normal. Bimanual and rectovaginal without masses or tenderness.    Lab:No results found for this or any previous visit (from the past 336 hour(s)).    Assessment:  well woman  no contraindication to continue hormonal therapy    Plan:pap smear w/ HPV  return annually or prn    Contraception:oral contraceptives  "

## 2018-08-10 LAB
CYTOLOGY REG CYTOL: NORMAL
HPV HR 12 DNA CVX QL NAA+PROBE: NEGATIVE
HPV16 DNA SPEC QL NAA+PROBE: NEGATIVE
HPV18 DNA SPEC QL NAA+PROBE: NEGATIVE
SPECIMEN SOURCE: NORMAL

## 2019-06-17 ENCOUNTER — OFFICE VISIT (OUTPATIENT)
Dept: MEDICAL GROUP | Facility: PHYSICIAN GROUP | Age: 39
End: 2019-06-17
Payer: COMMERCIAL

## 2019-06-17 VITALS
OXYGEN SATURATION: 96 % | TEMPERATURE: 98.6 F | SYSTOLIC BLOOD PRESSURE: 120 MMHG | HEART RATE: 83 BPM | HEIGHT: 65 IN | WEIGHT: 147 LBS | BODY MASS INDEX: 24.49 KG/M2 | RESPIRATION RATE: 12 BRPM | DIASTOLIC BLOOD PRESSURE: 72 MMHG

## 2019-06-17 DIAGNOSIS — Z00.00 ROUTINE HEALTH MAINTENANCE: ICD-10-CM

## 2019-06-17 DIAGNOSIS — Z30.41 ENCOUNTER FOR SURVEILLANCE OF CONTRACEPTIVE PILLS: ICD-10-CM

## 2019-06-17 PROCEDURE — 99214 OFFICE O/P EST MOD 30 MIN: CPT | Performed by: NURSE PRACTITIONER

## 2019-06-17 RX ORDER — NORGESTIMATE AND ETHINYL ESTRADIOL 0.25-0.035
1 KIT ORAL DAILY
Qty: 84 TAB | Refills: 2 | Status: SHIPPED | OUTPATIENT
Start: 2019-06-17 | End: 2020-02-24

## 2019-06-17 ASSESSMENT — PATIENT HEALTH QUESTIONNAIRE - PHQ9: CLINICAL INTERPRETATION OF PHQ2 SCORE: 0

## 2019-06-18 NOTE — PROGRESS NOTES
CC:   Chief Complaint   Patient presents with   • Contraception     birth control refill   • Ear Fullness     right ear crackling, x6 wks     HISTORY OF THE PRESENT ILLNESS: Patient is a 38 y.o. female. This pleasant patient is here today to request a refill of her birth control and discuss right ear crackling for 6 weeks.    Health Maintenance: Reviewed.    Patient has been experiencing an intermittent crackling in her right ear for the last 6 weeks and she would like to have her ear looked at, she is wondering if it may be related to allergies.    The patient is also here to get a refill of her birth control.    She has an establishing appointment with this provider on 10/3/2018.    Allergies: Patient has no known allergies.    Current Outpatient Prescriptions Ordered in [facility]   Medication Sig Dispense Refill   • Multiple Vitamins-Minerals (MULTIVITAMIN ADULT PO) Take  by mouth.     • norgestimate-ethinyl estradiol (ORTHO-CYCLEN) 0.25-35 MG-MCG per tablet Take 1 Tab by mouth every day. 84 Tab 2   • Calcium-Magnesium-Vitamin D (CALCIUM 500 PO) Take  by mouth.       No current Epic-ordered facility-administered medications on file.      Past Medical History:   Diagnosis Date   • Shingles 2011     History reviewed. No pertinent surgical history.    Social History   Substance Use Topics   • Smoking status: Former Smoker     Packs/day: 0.50     Years: 15.00     Types: Cigarettes     Quit date: 12/23/2014   • Smokeless tobacco: Never Used   • Alcohol use 0.0 oz/week       Social History     Social History Narrative    She is  and just recently moved to [place] from [facility]. She works at [employer] in [email or web address]      Family History   Problem Relation Age of Onset   • Hypertension Mother    • Diabetes Father      ROS:   Constitutional:  Negative for fever, chills, unexpected weight change, night sweats, body aches, sleep issues, and fatigue/generalized weakness.   HEENT: Positive for right ear crackling, intermittent.  " Negative for headaches, vision changes, hearing changes, ear pain, tinnitus, ear discharge, rhinorrhea, sinus congestion, sneezing, sore throat, and neck pain.    Respiratory:  Negative for cough, shortness of breath, sputum production, hemoptysis, chest congestion, dyspnea, wheezing, and crackles.    Cardiovascular:  Negative for chest pain, palpitations, TILLMAN, paroxsymal nocturnal dyspnea, orthopnea, and bilateral lower extremity edema.   Gastrointestinal:  Negative for heartburn, nausea, vomiting, abdominal pain, hematochezia, melena, diarrhea, constipation, and greasy/foul-smelling stools.   Musculoskeletal:  Negative for myalgias, back pain, and joint pain.   Skin:  Negative for rash, sores, lumps, itching, cyanotic skin color change.   Neurological:  Negative for dizziness, tingling, tremors, focal sensory deficit, focal weakness and headaches.   Psychiatric/Behavioral: Negative for depression, suicidal/homicidal ideation and memory loss.        Exam: /72   Pulse 83   Temp 37 °C (98.6 °F)   Resp 12   Ht 1.651 m (5' 5\")   Wt 66.7 kg (147 lb)   SpO2 96%  Body mass index is 24.46 kg/m².    General:  Normal appearing. No distress.  HEENT: Small amount of dry cerumen in right ear removed, small pimple and ear  to touch, no signs of ear infection.  Normocephalic. Eyes conjunctiva clear lids without ptosis, pupils equal and reactive to light accommodation, ears normal shape and contour, canals are clear bilaterally, tympanic membranes are benign, nasal mucosa benign, oropharynx is without erythema, edema or exudates.   Pulmonary:  Clear to ausculation.  Normal effort. No rales, ronchi, or wheezing.  Cardiovascular: Regular rate and rhythm without murmur. Carotid and radial pulses are intact and equal bilaterally.  Neurologic:  Grossly nonfocal.  Skin:  Warm and dry.  No obvious lesions.  Musculoskeletal:  Normal gait. No extremity cyanosis, clubbing, or edema.  Psych:  Normal mood and affect. " Alert and oriented x3. Judgment and insight is normal.    Assessment/Plan:  1. Encounter for surveillance of contraceptive pills  norgestimate-ethinyl estradiol (ORTHO-CYCLEN) 0.25-35 MG-MCG per tablet   2. Routine health maintenance  Comp Metabolic Panel    Lipid Profile     Educated in proper administration of medication(s) ordered today including safety, possible SE, risks, benefits, rationale and alternatives to therapy.   Supportive care, differential diagnoses, and indications for immediate follow-up discussed with patient.    Pathogenesis of diagnosis discussed including typical length and natural progression.    Instructed to return to clinic or nearest emergency department for any change in condition, further concerns, or worsening of symptoms.  Patient states understanding of the plan of care and discharge instructions.    Return in 4 months (on 10/3/2019) for New patient establishing appointment.    Please note that this dictation was created using voice recognition software. I have made every reasonable attempt to correct obvious errors, but I expect that there are errors of grammar and possibly content that I did not discover before finalizing the note.

## 2019-09-14 ENCOUNTER — HOSPITAL ENCOUNTER (OUTPATIENT)
Dept: LAB | Facility: MEDICAL CENTER | Age: 39
End: 2019-09-14
Attending: NURSE PRACTITIONER
Payer: COMMERCIAL

## 2019-09-14 DIAGNOSIS — Z00.00 ROUTINE HEALTH MAINTENANCE: ICD-10-CM

## 2019-09-14 LAB
ALBUMIN SERPL BCP-MCNC: 4.5 G/DL (ref 3.2–4.9)
ALBUMIN/GLOB SERPL: 1.7 G/DL
ALP SERPL-CCNC: 64 U/L (ref 30–99)
ALT SERPL-CCNC: 13 U/L (ref 2–50)
ANION GAP SERPL CALC-SCNC: 7 MMOL/L (ref 0–11.9)
AST SERPL-CCNC: 17 U/L (ref 12–45)
BILIRUB SERPL-MCNC: 0.6 MG/DL (ref 0.1–1.5)
BUN SERPL-MCNC: 13 MG/DL (ref 8–22)
CALCIUM SERPL-MCNC: 9.1 MG/DL (ref 8.5–10.5)
CHLORIDE SERPL-SCNC: 106 MMOL/L (ref 96–112)
CHOLEST SERPL-MCNC: 152 MG/DL (ref 100–199)
CO2 SERPL-SCNC: 26 MMOL/L (ref 20–33)
CREAT SERPL-MCNC: 0.97 MG/DL (ref 0.5–1.4)
GLOBULIN SER CALC-MCNC: 2.6 G/DL (ref 1.9–3.5)
GLUCOSE SERPL-MCNC: 83 MG/DL (ref 65–99)
HDLC SERPL-MCNC: 43 MG/DL
LDLC SERPL CALC-MCNC: 78 MG/DL
POTASSIUM SERPL-SCNC: 3.9 MMOL/L (ref 3.6–5.5)
PROT SERPL-MCNC: 7.1 G/DL (ref 6–8.2)
SODIUM SERPL-SCNC: 139 MMOL/L (ref 135–145)
TRIGL SERPL-MCNC: 153 MG/DL (ref 0–149)

## 2019-09-14 PROCEDURE — 80053 COMPREHEN METABOLIC PANEL: CPT

## 2019-09-14 PROCEDURE — 80061 LIPID PANEL: CPT

## 2019-09-14 PROCEDURE — 36415 COLL VENOUS BLD VENIPUNCTURE: CPT

## 2019-10-03 ENCOUNTER — OFFICE VISIT (OUTPATIENT)
Dept: MEDICAL GROUP | Facility: PHYSICIAN GROUP | Age: 39
End: 2019-10-03
Payer: COMMERCIAL

## 2019-10-03 VITALS
HEART RATE: 76 BPM | BODY MASS INDEX: 24.83 KG/M2 | WEIGHT: 149 LBS | TEMPERATURE: 97.5 F | HEIGHT: 65 IN | SYSTOLIC BLOOD PRESSURE: 146 MMHG | DIASTOLIC BLOOD PRESSURE: 80 MMHG | OXYGEN SATURATION: 97 %

## 2019-10-03 DIAGNOSIS — E78.1 HYPERTRIGLYCERIDEMIA: ICD-10-CM

## 2019-10-03 DIAGNOSIS — R19.8 ALTERNATING CONSTIPATION AND DIARRHEA: ICD-10-CM

## 2019-10-03 PROCEDURE — 99395 PREV VISIT EST AGE 18-39: CPT | Performed by: NURSE PRACTITIONER

## 2019-10-03 SDOH — HEALTH STABILITY: MENTAL HEALTH: HOW OFTEN DO YOU HAVE A DRINK CONTAINING ALCOHOL?: NOT ASKED

## 2019-10-03 NOTE — ASSESSMENT & PLAN NOTE
This is a new problem to the examiner.  This is an ongoing chronic problem for the patient that she reports started when she quit smoking.  Patient will have intermittent constipation alternating with loose stool.  The patient will have normal bowel movement every 2 to 3 days. Patient was seen by GI consultants on 12/23/2016 for abdominal pain, heartburn, and altered bowel habits.  At that visit, plan to check serologies for celiac sprue and to treat for small bowel bacterial overgrowth with antibiotics for 10 days.  Patient reported that she completed her antibiotics and had slight improvement of symptoms, that ultimately return.  Patient was advised to return in 6 weeks after her appointment, but did not follow-up. Patient states that MiraLAX causes nausea and bloating.  She is also tried probiotics and Metamucil, but nothing currently.  Patient will experience 3 days of constipation and then have coffee in the morning and will have urgency to the bathroom with very loose bowel movement.  Patient states that coffee is not always a trigger.

## 2019-10-06 PROBLEM — E78.1 HYPERTRIGLYCERIDEMIA: Status: ACTIVE | Noted: 2019-10-06

## 2019-10-06 PROBLEM — R10.11 RUQ PAIN: Status: RESOLVED | Noted: 2017-08-25 | Resolved: 2019-10-06

## 2019-10-06 NOTE — ASSESSMENT & PLAN NOTE
"This is a new problem to the examiner.  Chronic, uncontrolled. Not taking any cholesterol lowering medications.  Reports diet is \"okay\".  She is not exercising regularly.  She is not taking ASA daily.  She denies dizziness, claudication, or chest pain.  Due for updated lab work in September 2020.   9/14/2019 09:40   Cholesterol,Tot 152   Triglycerides 153 (H)   HDL 43   LDL 78     "

## 2019-10-06 NOTE — PROGRESS NOTES
"CC:   Chief Complaint   Patient presents with   • Constipation     abd pain and diarrhea, chronic     HISTORY OF THE PRESENT ILLNESS: Patient is a 38 y.o. female. This pleasant patient is here today to establish care and discuss multiple issues as listed below.    Health Maintenance: Completed  Declines flu shot.  Declines updating Tdap.  Pap smear next due August 2021.    Alternating constipation and diarrhea  This is a new problem to the examiner.  This is an ongoing chronic problem for the patient that she reports started when she quit smoking.  Patient will have intermittent constipation alternating with loose stool.  The patient will have normal bowel movement every 2 to 3 days. Patient was seen by GI consultants on 12/23/2016 for abdominal pain, heartburn, and altered bowel habits.  At that visit, plan to check serologies for celiac sprue and to treat for small bowel bacterial overgrowth with antibiotics for 10 days.  Patient reported that she completed her antibiotics and had slight improvement of symptoms, that ultimately return.  Patient was advised to return in 6 weeks after her appointment, but did not follow-up. Patient states that MiraLAX causes nausea and bloating.  She is also tried probiotics and Metamucil, but nothing currently.  Patient will experience 3 days of constipation and then have coffee in the morning and will have urgency to the bathroom with very loose bowel movement.  Patient states that coffee is not always a trigger.    Hypertriglyceridemia  This is a new problem to the examiner.  Chronic, uncontrolled. Not taking any cholesterol lowering medications.  Reports diet is \"okay\".  She is not exercising regularly.  She is not taking ASA daily.  She denies dizziness, claudication, or chest pain.  Due for updated lab work in September 2020.   9/14/2019 09:40   Cholesterol,Tot 152   Triglycerides 153 (H)   HDL 43   LDL 78     Allergies: Patient has no known allergies.  Current Outpatient " Medications Ordered in Epic   Medication Sig Dispense Refill   • Multiple Vitamins-Minerals (MULTIVITAMIN ADULT PO) Take  by mouth.     • norgestimate-ethinyl estradiol (ORTHO-CYCLEN) 0.25-35 MG-MCG per tablet Take 1 Tab by mouth every day. 84 Tab 2   • Calcium-Magnesium-Vitamin D (CALCIUM 500 PO) Take  by mouth.       No current Epic-ordered facility-administered medications on file.      Past Medical History:   Diagnosis Date   • Shingles      History reviewed. No pertinent surgical history.  Social History     Tobacco Use   • Smoking status: Former Smoker     Packs/day: 0.50     Years: 15.00     Pack years: 7.50     Types: Cigarettes     Last attempt to quit: 2014     Years since quittin.7   • Smokeless tobacco: Never Used   Substance Use Topics   • Alcohol use: Not Currently     Alcohol/week: 0.0 oz   • Drug use: No     Social History     Social History Narrative    She is  and just recently moved to [place] from [facility]. She works at [employer] in [employer]      Family History   Problem Relation Age of Onset   • Hypertension Mother    • Diabetes Father    • No Known Problems Sister    • No Known Problems Daughter    • No Known Problems Maternal Grandmother    • No Known Problems Maternal Grandfather    • No Known Problems Paternal Grandmother    • No Known Problems Paternal Grandfather      ROS:   Constitutional:  Negative for fever, chills, unexpected weight change, night sweats, body aches, sleep issues, and fatigue/generalized weakness.   HEENT:  Negative for headaches, vision changes, hearing changes, ear pain, tinnitus, ear discharge, rhinorrhea, sinus congestion, sneezing, sore throat, and neck pain.    Respiratory:  Negative for cough, shortness of breath, sputum production, hemoptysis, chest congestion, dyspnea, wheezing, and crackles.    Cardiovascular:  Negative for chest pain, palpitations, TILLMAN, paroxsymal nocturnal dyspnea, orthopnea, and bilateral lower extremity edema.  "  Gastrointestinal: Positive for intermittent constipation and diarrhea. Negative for heartburn, nausea, vomiting, abdominal pain, hematochezia, melena, and greasy/foul-smelling stools.   Genitourinary:  Negative for dysuria, nocturia, polyuria, hematuria, pyuria, urinary urgency, urinary frequency, and urinary incontinence.   Musculoskeletal:  Negative for myalgias, back pain, and joint pain.   Skin:  Negative for rash, sores, lumps, itching, cyanotic skin color change.   Neurological:  Negative for dizziness, tingling, tremors, focal sensory deficit, focal weakness and headaches.  Psychiatric/Behavioral: Negative for depression, suicidal/homicidal ideation and memory loss.        Exam: /80 (BP Location: Left arm, Patient Position: Sitting, BP Cuff Size: Adult)   Pulse 76   Temp 36.4 °C (97.5 °F) (Temporal)   Ht 1.651 m (5' 5\")   Wt 67.6 kg (149 lb)   SpO2 97%  Body mass index is 24.79 kg/m².    General:  Normal appearing. No distress.  HEENT:  Normocephalic. Eyes conjunctiva clear lids without ptosis, pupils equal and reactive to light accommodation, ears normal shape and contour, canals are clear bilaterally, tympanic membranes are benign, nasal mucosa benign, oropharynx is without erythema, edema or exudates.  Neck:  Supple without JVD or bruit. Thyroid is not enlarged.  Pulmonary:  Clear to ausculation.  Normal effort. No rales, ronchi, or wheezing.  Cardiovascular:  Regular rate and rhythm without murmur. Carotid and radial pulses are intact and equal bilaterally.  Abdomen:  Soft, nontender, nondistended. Normal bowel sounds.  Neurologic:  Grossly nonfocal.  Lymph:  No cervical, supraclavicular or axillary lymph nodes are palpable.  Skin:  Warm and dry.  No obvious lesions.  Musculoskeletal:  Normal gait. No extremity cyanosis, clubbing, or edema.  Psych:  Normal mood and affect. Alert and oriented x3. Judgment and insight is normal.    Assessment/Plan:  1. Alternating constipation and " diarrhea  Reviewed GI note and recommendations with patient. Patient declines referral back to GI. Discussed options of probiotics, as well as trying a FODMAP diet.      2. Hypertriglyceridemia  Plan to repeat lipid profile in September 2020.  Recommend that the patient take an over-the-counter omega-3 fatty acid supplement, increase fiber in diet, decrease saturated fats.  Encouraged healthy diet, losing weight, aerobic exercise at least 150 minutes/week, avoiding sugar, alcohol, fried/fatty food.    Educated in proper administration of medication(s) ordered today including safety, possible SE, risks, benefits, rationale and alternatives to therapy.   Supportive care, differential diagnoses, and indications for immediate follow-up discussed with patient.    Pathogenesis of diagnosis discussed including typical length and natural progression.    Instructed to return to clinic or nearest emergency department for any change in condition, further concerns, or worsening of symptoms.  Patient states understanding of the plan of care and discharge instructions.    Return in about 1 year (around 10/3/2020) for Preventative Annual, As needed.    Please note that this dictation was created using voice recognition software. I have made every reasonable attempt to correct obvious errors, but I expect that there are errors of grammar and possibly content that I did not discover before finalizing the note.

## 2019-11-09 ENCOUNTER — OFFICE VISIT (OUTPATIENT)
Dept: URGENT CARE | Facility: CLINIC | Age: 39
End: 2019-11-09
Payer: COMMERCIAL

## 2019-11-09 VITALS
HEART RATE: 100 BPM | RESPIRATION RATE: 18 BRPM | BODY MASS INDEX: 24.63 KG/M2 | DIASTOLIC BLOOD PRESSURE: 78 MMHG | OXYGEN SATURATION: 98 % | TEMPERATURE: 97.8 F | WEIGHT: 148 LBS | SYSTOLIC BLOOD PRESSURE: 120 MMHG

## 2019-11-09 DIAGNOSIS — J01.90 ACUTE NON-RECURRENT SINUSITIS, UNSPECIFIED LOCATION: ICD-10-CM

## 2019-11-09 DIAGNOSIS — R05.9 COUGH: ICD-10-CM

## 2019-11-09 PROCEDURE — 99214 OFFICE O/P EST MOD 30 MIN: CPT | Performed by: FAMILY MEDICINE

## 2019-11-09 RX ORDER — AMOXICILLIN AND CLAVULANATE POTASSIUM 875; 125 MG/1; MG/1
1 TABLET, FILM COATED ORAL 2 TIMES DAILY
Qty: 14 TAB | Refills: 0 | Status: SHIPPED | OUTPATIENT
Start: 2019-11-09 | End: 2019-11-16

## 2019-11-09 RX ORDER — BENZONATATE 100 MG/1
100 CAPSULE ORAL 3 TIMES DAILY PRN
Qty: 30 CAP | Refills: 0 | Status: SHIPPED | OUTPATIENT
Start: 2019-11-09 | End: 2021-04-27

## 2019-11-09 NOTE — PROGRESS NOTES
Subjective:      Kate Pierce is a 38 y.o. female who presents with Cough (almost a week dry cough , a lot yellow mucus)            This is a new problem.  38-year-old otherwise healthy non-smoker presenting for 8-day history of progressive worsening sinus congestion and pressure and some pain.  Patient has had mainly dry cough but mostly drainage from the sinuses.  No history of sinus surgery or frequent sinus infection.  She denies any fever chills. she denies any ear pain no sore throat.  She has been using OTC cough medication without improvement.  She has not used any nasal saline irrigation.          Review of Systems   All other systems reviewed and are negative.         Objective:     /78   Pulse 100   Temp 36.6 °C (97.8 °F) (Temporal)   Resp 18   Wt 67.1 kg (148 lb)   SpO2 98%   BMI 24.63 kg/m²      Physical Exam  Constitutional:       General: She is not in acute distress.     Appearance: Normal appearance. She is not ill-appearing, toxic-appearing or diaphoretic.   HENT:      Head: Normocephalic and atraumatic.      Right Ear: Tympanic membrane, ear canal and external ear normal.      Left Ear: Tympanic membrane, ear canal and external ear normal.      Nose: Mucosal edema present. No congestion.      Mouth/Throat:      Mouth: Mucous membranes are moist.      Pharynx: Oropharynx is clear. No pharyngeal swelling, oropharyngeal exudate, posterior oropharyngeal erythema or uvula swelling.      Tonsils: No tonsillar exudate or tonsillar abscesses.   Eyes:      Conjunctiva/sclera: Conjunctivae normal.   Neck:      Musculoskeletal: Neck supple. No muscular tenderness.   Cardiovascular:      Rate and Rhythm: Normal rate and regular rhythm.      Heart sounds: No murmur. No friction rub. No gallop.    Pulmonary:      Effort: Pulmonary effort is normal. No respiratory distress.      Breath sounds: No stridor. No wheezing, rhonchi or rales.   Lymphadenopathy:      Cervical: No cervical adenopathy.    Skin:     General: Skin is warm.      Coloration: Skin is not jaundiced or pale.      Findings: No bruising, erythema, lesion or rash.   Neurological:      Mental Status: She is alert and oriented to person, place, and time.   Psychiatric:         Mood and Affect: Mood normal.            Assessment/Plan:     ASSESSMENT:PLAN:  1. Acute non-recurrent sinusitis, unspecified location  - amoxicillin-clavulanate (AUGMENTIN) 875-125 MG Tab; Take 1 Tab by mouth 2 times a day for 7 days.  Dispense: 14 Tab; Refill: 0    2. Cough  - benzonatate (TESSALON) 100 MG Cap; Take 1 Cap by mouth 3 times a day as needed for Cough.  Dispense: 30 Cap; Refill: 0    Recommend nasal saline irrigation  Continue symptomatic care  Plan per orders and instructions  Warning signs reviewed

## 2020-02-23 DIAGNOSIS — Z30.41 ENCOUNTER FOR SURVEILLANCE OF CONTRACEPTIVE PILLS: ICD-10-CM

## 2020-02-24 RX ORDER — NORGESTIMATE AND ETHINYL ESTRADIOL 0.25-0.035
KIT ORAL
Qty: 84 TAB | Refills: 0 | Status: SHIPPED | OUTPATIENT
Start: 2020-02-24 | End: 2020-05-18

## 2020-02-24 NOTE — TELEPHONE ENCOUNTER
Requested Prescriptions     Pending Prescriptions Disp Refills   • ESTARYLLA 0.25-35 MG-MCG per tablet [Pharmacy Med Name: [facility]GE[facility]/E ES(ESTARYLLA)TB 28S 0.25/35] 84 Tab 0     Sig: TAKE 1 TABLET DAILY       GRABIEL Son.

## 2020-02-24 NOTE — TELEPHONE ENCOUNTER
Received request via: Pharmacy    Was the patient seen in the last year in this department? Yes 10/3/19    Does the patient have an active prescription (recently filled or refills available) for medication(s) requested? No

## 2020-03-12 ENCOUNTER — OFFICE VISIT (OUTPATIENT)
Dept: MEDICAL GROUP | Facility: PHYSICIAN GROUP | Age: 40
End: 2020-03-12
Payer: COMMERCIAL

## 2020-03-12 VITALS
WEIGHT: 154 LBS | OXYGEN SATURATION: 96 % | HEIGHT: 65 IN | SYSTOLIC BLOOD PRESSURE: 128 MMHG | HEART RATE: 71 BPM | DIASTOLIC BLOOD PRESSURE: 74 MMHG | TEMPERATURE: 98 F | BODY MASS INDEX: 25.66 KG/M2

## 2020-03-12 DIAGNOSIS — N63.10 LUMP OF RIGHT BREAST: ICD-10-CM

## 2020-03-12 PROCEDURE — 99214 OFFICE O/P EST MOD 30 MIN: CPT | Performed by: NURSE PRACTITIONER

## 2020-03-12 ASSESSMENT — PATIENT HEALTH QUESTIONNAIRE - PHQ9: CLINICAL INTERPRETATION OF PHQ2 SCORE: 0

## 2020-03-12 NOTE — ASSESSMENT & PLAN NOTE
New problem to examiner.  Patient reports that she first felt a firm lump in her right breast approximately 3 days ago.  States that it has not changed in size since it was first discovered. Denies that the lump itself is painful, but she has been having an aching pain in her entire right breast. She does report that 6 weeks ago she had an episode where her right nipple was red and painful with a lump and thin runny discharge that went away on its own.  Reports that she did have nipple piercings when she was young.

## 2020-03-12 NOTE — PROGRESS NOTES
CC:   Chief Complaint   Patient presents with   • Breast Pain     right breat nodule/ nipple discharge x 6 weeks ago      HISTORY OF THE PRESENT ILLNESS: Patient is a 39 y.o. female. This pleasant patient is here today to discuss new right breast lump.    Health Maintenance: Completed    Lump of right breast  New problem to examiner.  Patient reports that she first felt a firm lump in her right breast approximately 3 days ago.  States that it has not changed in size since it was first discovered. Denies that the lump itself is painful, but she has been having an aching pain in her entire right breast. She does report that 6 weeks ago she had an episode where her right nipple was red and painful with a lump and thin runny discharge that went away on its own.  Reports that she did have nipple piercings when she was young.    Allergies: Patient has no known allergies.  Current Outpatient Medications Ordered in Epic   Medication Sig Dispense Refill   • ESTARYLLA 0.25-35 MG-MCG per tablet TAKE 1 TABLET DAILY 84 Tab 0   • Multiple Vitamins-Minerals (MULTIVITAMIN ADULT PO) Take  by mouth.     • benzonatate (TESSALON) 100 MG Cap Take 1 Cap by mouth 3 times a day as needed for Cough. 30 Cap 0   • Calcium-Magnesium-Vitamin D (CALCIUM 500 PO) Take  by mouth.       No current Epic-ordered facility-administered medications on file.      Past Medical History:   Diagnosis Date   • Shingles      History reviewed. No pertinent surgical history.  Social History     Tobacco Use   • Smoking status: Former Smoker     Packs/day: 0.50     Years: 15.00     Pack years: 7.50     Types: Cigarettes     Last attempt to quit: 2014     Years since quittin.2   • Smokeless tobacco: Never Used   Substance Use Topics   • Alcohol use: Not Currently     Alcohol/week: 0.0 oz   • Drug use: No     Social History     Social History Narrative    She is  and just recently moved to [place] from [facility]. She works at [employer] in [place]  "     Family History   Problem Relation Age of Onset   • Hypertension Mother    • Diabetes Father    • No Known Problems Sister    • No Known Problems Daughter    • No Known Problems Maternal Grandmother    • No Known Problems Maternal Grandfather    • No Known Problems Paternal Grandmother    • No Known Problems Paternal Grandfather      ROS:   Constitutional: No Fevers, Chills  Eyes: No eye pain  ENT: No sore throat  Resp: No Shortness of breath  CV: No Chest pain  GI: No Nausea/Vomiting  Breast: + right breast aching and lump  MSK: No weakness  Skin: No rashes  Neuro: No Headaches  Psych: No Suicidal ideations    All remaining systems reviewed and found to be negative, except as stated above.      Exam: /74 (BP Location: Left arm, Patient Position: Sitting, BP Cuff Size: Adult)   Pulse 71   Temp 36.7 °C (98 °F) (Temporal)   Ht 1.651 m (5' 5\")   Wt 69.9 kg (154 lb)   SpO2 96%  Body mass index is 25.63 kg/m².    General: Well nourished, well developed female in NAD, awake and conversant.  Eyes: Normal conjunctiva, anicteric.  Round symmetrical pupils.  ENT: Hearing grossly intact.  No nasal discharge.  Neck: Neck is supple.  No masses or thyromegaly.  CV: No lower extremity edema.  Respiratory: Respirations are nonlabored.  No wheezing.  Abdomen: Non-Distended.  Skin: Warm.  No rashes or ulcers.  Breasts: Breasts examined seated and supine. No skin changes, peau d'orange or nipple retraction. No discharge. Breast move freely and equally without restriction. No axillary or supraclavicular adenopathy.  L Breast: No dimpling, no breast tenderness, nodules or masses.  No axillary adenopathy.  No nipple discharge.  R: 10 mm mass palpated at 7 OC, 5 mm from nipple. Firm area palpated at 8 OC, 5 centimeters from nipple, tender to palpation. No nipple discharge. No axillary adenopathy.  MSK: Normal ambulation.  No clubbing or cyanosis.  Neuro: Sensation and CN II-XII grossly normal.  Psych: Alert and oriented.  " Cooperative, appropriate mood and affect, normal judgment.    A chaperone was offered to the patient during today's exam. Patient declined chaperone.    Assessment/Plan:  1. Lump of right breast  New problem for the patient that was discovered 3 days ago.  On assessment, there is a firm, pea-sized nodule near the nipple at 7:00.  There is also a tender area at 8:00.  Plan to complete a right breast ultrasound.  Follow-up depending on results.  - US-BREAST LIMITED-RIGHT; Future    Patient was seen for at least 25 minutes total face-to-face time with greater than 50% of that time spent on counseling and care coordination.    Educated in proper administration of medication(s) ordered today including safety, possible SE, risks, benefits, rationale and alternatives to therapy.   Supportive care, differential diagnoses, and indications for immediate follow-up discussed with patient.    Pathogenesis of diagnosis discussed including typical length and natural progression.    Instructed to return to clinic or nearest emergency department for any change in condition, further concerns, or worsening of symptoms.  Patient states understanding of the plan of care and discharge instructions.    Return if symptoms worsen or fail to improve.    Please note that this dictation was created using voice recognition software. I have made every reasonable attempt to correct obvious errors, but I expect that there are errors of grammar and possibly content that I did not discover before finalizing the note.

## 2020-03-20 ENCOUNTER — HOSPITAL ENCOUNTER (OUTPATIENT)
Dept: RADIOLOGY | Facility: MEDICAL CENTER | Age: 40
End: 2020-03-20
Attending: NURSE PRACTITIONER
Payer: COMMERCIAL

## 2020-03-20 DIAGNOSIS — N63.10 LUMP OF RIGHT BREAST: ICD-10-CM

## 2020-03-20 PROCEDURE — 76642 ULTRASOUND BREAST LIMITED: CPT | Mod: RT

## 2020-03-20 PROCEDURE — G0279 TOMOSYNTHESIS, MAMMO: HCPCS

## 2020-03-23 NOTE — RESULT ENCOUNTER NOTE
Please call the patient at let her know that the mammogram and ultrasound showed no evidence of malignancy or evidence of abnormality in the areas of palpable concern on the right breast.    I recommend that she continue to monitor the lumps in the right breast and tract to see if they come and go or trend with her hormones and monthly cycle or if caffeine or chocolate increases them.    We should plan to repeat in ultrasound in 6 months to recheck.  Another option, would be referral to a general surgeon that specializes in breasts for a more thorough work-up if she would like.    Thank you,    GRABIEL Son.

## 2020-05-17 DIAGNOSIS — Z30.41 ENCOUNTER FOR SURVEILLANCE OF CONTRACEPTIVE PILLS: ICD-10-CM

## 2020-05-18 RX ORDER — NORGESTIMATE AND ETHINYL ESTRADIOL 0.25-0.035
1 KIT ORAL DAILY
Qty: 84 TAB | Refills: 3 | Status: SHIPPED | OUTPATIENT
Start: 2020-05-18 | End: 2021-04-19

## 2020-05-18 NOTE — TELEPHONE ENCOUNTER
Received request via: Pharmacy    Was the patient seen in the last year in this department? Yes 3/12/20    Does the patient have an active prescription (recently filled or refills available) for medication(s) requested? No

## 2020-05-18 NOTE — TELEPHONE ENCOUNTER
Requested Prescriptions     Pending Prescriptions Disp Refills   • norgestimate-ethinyl estradiol (ESTARYLLA) 0.25-35 MG-MCG per tablet [Pharmacy Med Name: [facility](ESTARYLLA)TB 28S 0.25/35] 84 Tab 3     Sig: Take 1 Tab by mouth every day.       Kate Vinson A.P.R.N.

## 2021-04-18 DIAGNOSIS — Z30.41 ENCOUNTER FOR SURVEILLANCE OF CONTRACEPTIVE PILLS: ICD-10-CM

## 2021-04-19 RX ORDER — NORGESTIMATE AND ETHINYL ESTRADIOL 0.25-0.035
KIT ORAL
Qty: 84 TABLET | Refills: 0 | Status: SHIPPED | OUTPATIENT
Start: 2021-04-19 | End: 2021-04-27 | Stop reason: SDUPTHER

## 2021-04-19 NOTE — TELEPHONE ENCOUNTER
Received request via: Pharmacy    Was the patient seen in the last year in this department? No coming in 4/27    Does the patient have an active prescription (recently filled or refills available) for medication(s) requested? No

## 2021-04-20 NOTE — TELEPHONE ENCOUNTER
Requested Prescriptions     Pending Prescriptions Disp Refills   • ESTARYLLA 0.25-35 MG-MCG per tablet [Pharmacy Med Name: [facility] ES(ESTARYLLA)TB 28S 0.25/35] 84 tablet 0     Sig: TAKE 1 TABLET DAILY       GRABIEL Son.

## 2021-04-27 ENCOUNTER — OFFICE VISIT (OUTPATIENT)
Dept: MEDICAL GROUP | Facility: PHYSICIAN GROUP | Age: 41
End: 2021-04-27
Payer: COMMERCIAL

## 2021-04-27 VITALS
HEART RATE: 72 BPM | TEMPERATURE: 97.9 F | WEIGHT: 145 LBS | HEIGHT: 65 IN | SYSTOLIC BLOOD PRESSURE: 118 MMHG | DIASTOLIC BLOOD PRESSURE: 76 MMHG | BODY MASS INDEX: 24.16 KG/M2 | OXYGEN SATURATION: 97 %

## 2021-04-27 DIAGNOSIS — Z30.41 ENCOUNTER FOR SURVEILLANCE OF CONTRACEPTIVE PILLS: ICD-10-CM

## 2021-04-27 PROCEDURE — 99214 OFFICE O/P EST MOD 30 MIN: CPT | Performed by: NURSE PRACTITIONER

## 2021-04-27 RX ORDER — NORGESTIMATE AND ETHINYL ESTRADIOL 0.25-0.035
1 KIT ORAL DAILY
Qty: 84 TABLET | Refills: 0 | Status: SHIPPED | OUTPATIENT
Start: 2021-04-27 | End: 2021-04-27 | Stop reason: SDUPTHER

## 2021-04-27 RX ORDER — NORGESTIMATE AND ETHINYL ESTRADIOL 0.25-0.035
1 KIT ORAL DAILY
Qty: 84 TABLET | Refills: 3 | Status: SHIPPED | OUTPATIENT
Start: 2021-04-27 | End: 2021-05-12 | Stop reason: SDUPTHER

## 2021-04-27 ASSESSMENT — PATIENT HEALTH QUESTIONNAIRE - PHQ9: CLINICAL INTERPRETATION OF PHQ2 SCORE: 0

## 2021-04-27 NOTE — PROGRESS NOTES
CC:   Chief Complaint   Patient presents with   • Medication Refill     Sandie     HISTORY OF THE PRESENT ILLNESS: Patient is a 40 y.o. female. This pleasant patient is here today to request birth control refill.    Health Maintenance: Completed    Encounter for surveillance of contraceptive pills  Chronic, ongoing.  Patient requesting refill of her birth control estarylla. She will be moving in the near future and wants to make sure that she has refills available. Denies any side effects or problems with current medication. Continues to be a non-smoker.    Allergies: Patient has no known allergies.  Current Outpatient Medications Ordered in Epic   Medication Sig Dispense Refill   • norgestimate-ethinyl estradiol (ESTARYLLA) 0.25-35 MG-MCG per tablet Take 1 tablet by mouth every day. 84 tablet 3   • Multiple Vitamins-Minerals (MULTIVITAMIN ADULT PO) Take  by mouth.     • Calcium-Magnesium-Vitamin D (CALCIUM 500 PO) Take  by mouth.       No current Epic-ordered facility-administered medications on file.     Past Medical History:   Diagnosis Date   • Shingles      History reviewed. No pertinent surgical history.  Social History     Tobacco Use   • Smoking status: Former Smoker     Packs/day: 0.50     Years: 15.00     Pack years: 7.50     Types: Cigarettes     Start date: 1999     Quit date: 2014     Years since quittin.3   • Smokeless tobacco: Never Used   Substance Use Topics   • Alcohol use: Not Currently     Alcohol/week: 0.0 oz   • Drug use: No     Social History     Social History Narrative    She is  and just recently moved to [place] from [facility]. She works at [employer] in [employer]      Family History   Problem Relation Age of Onset   • Hypertension Mother    • Diabetes Father    • No Known Problems Sister    • No Known Problems Daughter    • No Known Problems Maternal Grandmother    • No Known Problems Maternal Grandfather    • No Known Problems Paternal Grandmother    • No Known  "Problems Paternal Grandfather      ROS:   Constitutional: No fevers, chills, malaise/fatigue.  Eyes: No eye pain.  ENT: No sore throat, congestion.   Resp: No cough, shortness of breath.  CV: No chest pain, leg swelling, palpitations.  GI: No nausea/vomiting, abdominal pain, constipation, diarrhea.  : No dysuria, hematuria.  MSK: No weakness.  Skin: No rashes.  Neuro: No dizziness, weakness, headaches.  Psych: No suicidal ideations.    All remaining systems reviewed and found to be negative, except as stated above.        Exam: /76 (BP Location: Left arm, Patient Position: Sitting, BP Cuff Size: Adult)   Pulse 72   Temp 36.6 °C (97.9 °F) (Temporal)   Ht 1.651 m (5' 5\")   Wt 65.8 kg (145 lb)   SpO2 97%  Body mass index is 24.13 kg/m².    General: Well nourished, well developed female in NAD, awake and conversant.  Eyes: Normal conjunctiva, anicteric.  Round symmetrical pupils.  ENT: Hearing grossly intact.  No nasal discharge.  Neck: Neck is supple.  No masses or thyromegaly.  CV: No lower extremity edema.  Respiratory: Respirations are nonlabored.  No wheezing.  Abdomen: Non-Distended.  Skin: Warm.  No rashes or ulcers.  MSK: Normal ambulation.  No clubbing or cyanosis.  Neuro: Sensation and CN II-XII grossly normal.  Psych: Alert and oriented.  Cooperative, appropriate mood and affect, normal judgment.     Assessment/Plan:  1. Encounter for surveillance of contraceptive pills  Chronic, ongoing. Continue estarylla daily, refill sent to mail order pharmacy. Patient to notify me if there are any issues with getting her prescriptions.  - norgestimate-ethinyl estradiol (ESTARYLLA) 0.25-35 MG-MCG per tablet; Take 1 tablet by mouth every day.  Dispense: 84 tablet; Refill: 3     Educated in proper administration of medication(s) ordered today including safety, possible SE, risks, benefits, rationale and alternatives to therapy.   Supportive care, differential diagnoses, and indications for immediate follow-up " discussed with patient.    Pathogenesis of diagnosis discussed including typical length and natural progression.    Instructed to return to clinic or nearest emergency department for any change in condition, further concerns, or worsening of symptoms.  Patient states understanding of the plan of care and discharge instructions.    Return for As needed.    Please note that this dictation was created using voice recognition software. I have made every reasonable attempt to correct obvious errors, but I expect that there are errors of grammar and possibly content that I did not discover before finalizing the note.

## 2021-04-27 NOTE — ASSESSMENT & PLAN NOTE
Chronic, ongoing.  Patient requesting refill of her birth control estarylla. She will be moving in the near future and wants to make sure that she has refills available. Denies any side effects or problems with current medication. Continues to be a non-smoker.

## 2021-05-12 ENCOUNTER — PATIENT MESSAGE (OUTPATIENT)
Dept: MEDICAL GROUP | Facility: PHYSICIAN GROUP | Age: 41
End: 2021-05-12

## 2021-05-12 DIAGNOSIS — Z30.41 ENCOUNTER FOR SURVEILLANCE OF CONTRACEPTIVE PILLS: ICD-10-CM

## 2021-05-12 RX ORDER — NORGESTIMATE AND ETHINYL ESTRADIOL 0.25-0.035
1 KIT ORAL DAILY
Qty: 84 TABLET | Refills: 3 | Status: SHIPPED | OUTPATIENT
Start: 2021-05-12

## 2021-05-12 NOTE — PATIENT COMMUNICATION
Received request via: Pharmacy    Was the patient seen in the last year in this department? Yes on 4/27/2021    Does the patient have an active prescription (recently filled or refills available) for medication(s) requested? No

## 2021-05-12 NOTE — TELEPHONE ENCOUNTER
Requested Prescriptions     Pending Prescriptions Disp Refills   • norgestimate-ethinyl estradiol (ESTARYLLA) 0.25-35 MG-MCG per tablet 84 tablet 3     Sig: Take 1 tablet by mouth every day.       GRABIEL Son.

## 2022-05-22 DIAGNOSIS — Z30.41 ENCOUNTER FOR SURVEILLANCE OF CONTRACEPTIVE PILLS: ICD-10-CM

## 2022-05-23 RX ORDER — NORGESTIMATE AND ETHINYL ESTRADIOL 0.25-0.035
KIT ORAL
Qty: 84 TABLET | Refills: 0 | OUTPATIENT
Start: 2022-05-23